# Patient Record
(demographics unavailable — no encounter records)

---

## 2025-03-13 NOTE — ASSESSMENT
[FreeTextEntry1] : 65 yo RHF w/ h/o R ilioinguinal/genitofemoral neuralgia possibly related to previous hip/inguinal hernia surgery currently w/ recurrent L TGN partially treated with gabapentin 400 mg BID with additional PRN dose and is currently improving with conservative measures.  Recommend consistent dosage of gabapentin 400 mg TID with additional dose PRN and track usage.   Plan: - increase gabapentin to 400 mg TID w/ additional dose PRN - track gabapentin dose - continue exercises/stretching -Follow in 4 - 6 months.

## 2025-03-13 NOTE — HISTORY OF PRESENT ILLNESS
[FreeTextEntry1] : Since her last visit, Ms. Corley has had important in the R groin pain with chair yoga and therapy.  Pain currently manageable has occasional recurrence of lancinating pain but spontaneously resolves lasting 5 minutes at a time occurring a few times per week.  Doesn't limit her activities.  More recently has noted more frequent lancinating pain in the L face in V1 - V3 distribution not associated with allodynia chronic but more noticeable taking gabapentin 400 - 800 mg daily PRN.  Denies any side effects to gabapentin.  Had seen pain management Dr. Corley who prescribed Elavil but had to discontinue due to side effect of grogginess.  Has limitations in chewing due pain but pain is tolerable.

## 2025-03-13 NOTE — PHYSICAL EXAM
[FreeTextEntry1] : AD. AOx3. Intact memory. Speech fluent, nondysarthric. CN 2 - 12 normal. Strength 5/5 b/l UE/LE. NL tone, bulk. No abnl movements. DTRs 2+ throughout. Plantar response downgoing b/l. (-) Hoffmans, clonus. Sensory intact LT/PP, pain, temp, proprioception and vibration. NL FTN/HKS. No dysdiadokinesia. Gait narrow based/NL tandem.

## 2025-05-05 NOTE — DISCUSSION/SUMMARY
[EKG obtained to assist in diagnosis and management of assessed problem(s)] : EKG obtained to assist in diagnosis and management of assessed problem(s) [FreeTextEntry1] : EKG performed today was unremarkable.  SOB: Recommend an echocardiogram to evaluate LV function and rule out valvular heart disease.   Palpitations: Recommend an MCOT monitor x3 days to evaluate for etiology of palpitations.  Hypercholesterolemia: Currently, the condition is stable. There are no changes in medication management. Continue current medical therapy. Other planned treatment includes diet modification, exercise, and weight loss.  Instructed to follow up after testing is complete.  Plan was discussed with the patient.

## 2025-05-05 NOTE — CARDIOLOGY SUMMARY
[de-identified] : 05/02/2025: NSR, normal axis, normal intervals, (-) ST-T wave changes. 07/05/2023: NSR, normal axis, normal intervals, (-) ST-T wave changes. ======================================================= [de-identified] : TTE - 07/05/2023:  CONCLUSIONS: 1. The left ventricular systolic function is normal with an ejection fraction visually estimated at 60 to 65 %. 2. Normal atria. 3. Normal right ventricular systolic function. 4. Normal aortic valve, without any evidence of aortic stenosis or regurgitation. 5. Structurally normal pulmonic valve with normal leaflet excursion. 6. No significant valvular disease. 7. Structurally normal mitral valve with normal leaflet excursion. 8. No pericardial effusion seen. 9. Structurally normal tricuspid valve with normal leaflet excursion. 10. Normal mitral valve structure and function. No mitral stenosis or significant regurgitation. 11. The pulmonary valve is normal in structure and function, with good leaflet excursion, and without any evidence of pulmonary stenosis or significant regurgitation. =======================================================

## 2025-05-05 NOTE — HISTORY OF PRESENT ILLNESS
[FreeTextEntry1] : TARAS ZARAGOZA is a 67-year-old female, with a PMHx significant for HLD, s/p inguinal hernia repair, and pudendal nerve entrapment, who presents today for a follow-up visit. Patient reports it feels like she cannot catch her breath at times. Indicates this occurs at rest and not with activity. Also endorses palpitations. Prior echo performed in 2023 was WNL.

## 2025-05-05 NOTE — CARDIOLOGY SUMMARY
[de-identified] : 05/02/2025: NSR, normal axis, normal intervals, (-) ST-T wave changes. 07/05/2023: NSR, normal axis, normal intervals, (-) ST-T wave changes. ======================================================= [de-identified] : TTE - 07/05/2023:  CONCLUSIONS: 1. The left ventricular systolic function is normal with an ejection fraction visually estimated at 60 to 65 %. 2. Normal atria. 3. Normal right ventricular systolic function. 4. Normal aortic valve, without any evidence of aortic stenosis or regurgitation. 5. Structurally normal pulmonic valve with normal leaflet excursion. 6. No significant valvular disease. 7. Structurally normal mitral valve with normal leaflet excursion. 8. No pericardial effusion seen. 9. Structurally normal tricuspid valve with normal leaflet excursion. 10. Normal mitral valve structure and function. No mitral stenosis or significant regurgitation. 11. The pulmonary valve is normal in structure and function, with good leaflet excursion, and without any evidence of pulmonary stenosis or significant regurgitation. =======================================================

## 2025-05-05 NOTE — REVIEW OF SYSTEMS
[Negative] : Heme/Lymph [Dyspnea on exertion] : not dyspnea during exertion [Chest Discomfort] : no chest discomfort [Lower Ext Edema] : no extremity edema [Leg Claudication] : no intermittent leg claudication [Syncope] : no syncope [Cough] : no cough [FreeTextEntry5] : (+) SOB, (+) Palpitations [FreeTextEntry6] : (+) SOB